# Patient Record
Sex: MALE | Race: WHITE | NOT HISPANIC OR LATINO | URBAN - METROPOLITAN AREA
[De-identification: names, ages, dates, MRNs, and addresses within clinical notes are randomized per-mention and may not be internally consistent; named-entity substitution may affect disease eponyms.]

---

## 2019-03-05 ENCOUNTER — OFFICE (OUTPATIENT)
Dept: URBAN - METROPOLITAN AREA CLINIC 101 | Facility: CLINIC | Age: 49
End: 2019-03-05

## 2019-03-05 ENCOUNTER — OFFICE (OUTPATIENT)
Dept: URBAN - METROPOLITAN AREA CLINIC 101 | Facility: CLINIC | Age: 49
End: 2019-03-05
Payer: MEDICAID

## 2019-03-05 VITALS
WEIGHT: 202 LBS | HEART RATE: 94 BPM | TEMPERATURE: 99.7 F | DIASTOLIC BLOOD PRESSURE: 102 MMHG | SYSTOLIC BLOOD PRESSURE: 149 MMHG | HEIGHT: 69 IN

## 2019-03-05 DIAGNOSIS — K59.09 OTHER CONSTIPATION: ICD-10-CM

## 2019-03-05 DIAGNOSIS — Z12.11 ENCOUNTER FOR SCREENING FOR MALIGNANT NEOPLASM OF COLON: ICD-10-CM

## 2019-03-05 DIAGNOSIS — R19.4 CHANGE IN BOWEL HABIT: ICD-10-CM

## 2019-03-05 PROCEDURE — 99203 OFFICE O/P NEW LOW 30 MIN: CPT

## 2019-03-05 PROCEDURE — 00031: CPT

## 2019-03-05 RX ORDER — ALUMINUM ZIRCONIUM OCTACHLOROHYDREX GLY 16 G/100G
GEL TOPICAL
Qty: 30 | Refills: 0 | Status: ACTIVE
Start: 2019-03-05

## 2019-03-05 NOTE — SERVICEHPINOTES
ANGEL SLAUGHTER   is a   48   year old male who is being seen in consultation at the request of   SHANON FOURNIER   for OV prior to colonoscopy. He mentions perception of having "diarrhea/constipation" but upon further questioning is more of a sensation of incomplete evacuation manifesting with daily BMs but only able to evacuate small amounts at a time ranging from BSS type 4 on initial bowel movement that transition to BSS type 6 towards the end. He notes it takes about 3 to 4 times to empty out in one setting and this has been going on for a long time: No fecal incontinence/No fecal urgency. He also has intermittent painless, BRBPR seen on wipe that occurs about 1-2x/month with mild irritation to anus that he attributes to h/o hemorrhoids. Prior colonoscopy/EGD about 20 years ago due to "abdominal issues" that removed benign polyp per patient. He mentions mild asthma with inhaler used about once per month or less. He states no cardiac or pulmonary disease. He has chronic reflux that responds well to Zantac 150 mg prn. Denies n/v, abdominal pain, fecal urgency, change in bowel habits, diarrhea, weight loss.He mentions h/o alcoholism given ETOH used 2-3x/week and more on the weekends. He declines EGD at this time that is advised due to ETOH intake. He states no h/o esophageal varices. Denies dysphagia/odynophagia, hematemesis, melena. BR